# Patient Record
Sex: FEMALE | URBAN - METROPOLITAN AREA
[De-identification: names, ages, dates, MRNs, and addresses within clinical notes are randomized per-mention and may not be internally consistent; named-entity substitution may affect disease eponyms.]

---

## 2017-08-23 ENCOUNTER — OFFICE (OUTPATIENT)
Dept: URBAN - METROPOLITAN AREA CLINIC 56 | Facility: CLINIC | Age: 62
End: 2017-08-23

## 2017-08-23 VITALS
DIASTOLIC BLOOD PRESSURE: 92 MMHG | SYSTOLIC BLOOD PRESSURE: 136 MMHG | HEART RATE: 60 BPM | HEIGHT: 63 IN | WEIGHT: 173 LBS

## 2017-08-23 DIAGNOSIS — R19.5 OB POSITIVE STOOL: ICD-10-CM

## 2017-08-23 PROCEDURE — 99203 OFFICE O/P NEW LOW 30 MIN: CPT

## 2017-08-23 NOTE — SERVICEHPINOTES
The patient is seen for evaluation of hemoccult positive stool.    Occult blood was detected on   on spontaneously passsed stool  .    The patient   has not   seen visible blood in the stool.  The blood is described as   brown stool   in color.   Associated symptoms include   none  .      The patient takes   aleve  .   There is a history of   none  .   Upper GI system review is notable for   occ abdpain  .   The patient has previously undergone   colonoscopy showed polyps 10 yrs ago   for screening and/or diagnostic evaluation.BRh/o colon [polyps 10 yrs ago

## 2019-04-01 ENCOUNTER — HOSPITAL ENCOUNTER (OUTPATIENT)
Dept: HOSPITAL 91 - GIL | Age: 64
Discharge: HOME | End: 2019-04-01
Payer: COMMERCIAL

## 2019-04-01 ENCOUNTER — HOSPITAL ENCOUNTER (OUTPATIENT)
Dept: HOSPITAL 10 - GIL | Age: 64
Discharge: HOME | End: 2019-04-01
Attending: INTERNAL MEDICINE
Payer: COMMERCIAL

## 2019-04-01 VITALS
HEIGHT: 65 IN | WEIGHT: 167.11 LBS | BODY MASS INDEX: 27.84 KG/M2 | BODY MASS INDEX: 27.84 KG/M2 | WEIGHT: 167.11 LBS | HEIGHT: 65 IN

## 2019-04-01 VITALS — SYSTOLIC BLOOD PRESSURE: 144 MMHG | DIASTOLIC BLOOD PRESSURE: 72 MMHG | RESPIRATION RATE: 18 BRPM | HEART RATE: 46 BPM

## 2019-04-01 VITALS — HEART RATE: 51 BPM | RESPIRATION RATE: 18 BRPM | DIASTOLIC BLOOD PRESSURE: 70 MMHG | SYSTOLIC BLOOD PRESSURE: 160 MMHG

## 2019-04-01 DIAGNOSIS — I10: ICD-10-CM

## 2019-04-01 DIAGNOSIS — D12.5: ICD-10-CM

## 2019-04-01 DIAGNOSIS — Z12.11: Primary | ICD-10-CM

## 2019-04-01 PROCEDURE — 88305 TISSUE EXAM BY PATHOLOGIST: CPT

## 2019-04-01 PROCEDURE — 45380 COLONOSCOPY AND BIOPSY: CPT
